# Patient Record
Sex: FEMALE | Race: AMERICAN INDIAN OR ALASKA NATIVE
[De-identification: names, ages, dates, MRNs, and addresses within clinical notes are randomized per-mention and may not be internally consistent; named-entity substitution may affect disease eponyms.]

---

## 2018-06-09 ENCOUNTER — HOSPITAL ENCOUNTER (INPATIENT)
Dept: HOSPITAL 31 - C.ER | Age: 83
LOS: 5 days | Discharge: NURSING, REHAB OR LONG-TERM CARE | DRG: 240 | End: 2018-06-14
Attending: SPECIALIST | Admitting: SPECIALIST
Payer: MEDICARE

## 2018-06-09 VITALS — BODY MASS INDEX: 28.1 KG/M2

## 2018-06-09 DIAGNOSIS — F02.80: ICD-10-CM

## 2018-06-09 DIAGNOSIS — Z95.0: ICD-10-CM

## 2018-06-09 DIAGNOSIS — D64.9: ICD-10-CM

## 2018-06-09 DIAGNOSIS — L97.529: ICD-10-CM

## 2018-06-09 DIAGNOSIS — Z79.4: ICD-10-CM

## 2018-06-09 DIAGNOSIS — E11.52: Primary | ICD-10-CM

## 2018-06-09 DIAGNOSIS — E78.5: ICD-10-CM

## 2018-06-09 DIAGNOSIS — I25.10: ICD-10-CM

## 2018-06-09 DIAGNOSIS — G30.9: ICD-10-CM

## 2018-06-09 DIAGNOSIS — I96: ICD-10-CM

## 2018-06-09 DIAGNOSIS — E11.621: ICD-10-CM

## 2018-06-09 DIAGNOSIS — I10: ICD-10-CM

## 2018-06-09 LAB
ALBUMIN SERPL-MCNC: 3.3 G/DL (ref 3.5–5)
ALBUMIN/GLOB SERPL: 1 {RATIO} (ref 1–2.1)
ALT SERPL-CCNC: 18 U/L (ref 9–52)
APTT BLD: 33 SECONDS (ref 21–34)
AST SERPL-CCNC: 42 U/L (ref 14–36)
BACTERIA #/AREA URNS HPF: (no result) /[HPF]
BASOPHILS # BLD AUTO: 0.1 K/UL (ref 0–0.2)
BASOPHILS NFR BLD: 1 % (ref 0–2)
BILIRUB UR-MCNC: NEGATIVE MG/DL
BUN SERPL-MCNC: 28 MG/DL (ref 7–17)
CALCIUM SERPL-MCNC: 9.2 MG/DL (ref 8.6–10.4)
EOSINOPHIL # BLD AUTO: 0 K/UL (ref 0–0.7)
EOSINOPHIL NFR BLD: 0.3 % (ref 0–4)
ERYTHROCYTE [DISTWIDTH] IN BLOOD BY AUTOMATED COUNT: 14.3 % (ref 11.5–14.5)
GFR NON-AFRICAN AMERICAN: 39
GLUCOSE UR STRIP-MCNC: NORMAL MG/DL
HGB BLD-MCNC: 8.9 G/DL (ref 11–16)
INR PPP: 1.2
LEUKOCYTE ESTERASE UR-ACNC: (no result) LEU/UL
LYMPHOCYTES # BLD AUTO: 1.6 K/UL (ref 1–4.3)
LYMPHOCYTES NFR BLD AUTO: 20.2 % (ref 20–40)
MCH RBC QN AUTO: 29 PG (ref 27–31)
MCHC RBC AUTO-ENTMCNC: 32.8 G/DL (ref 33–37)
MCV RBC AUTO: 88.4 FL (ref 81–99)
MONOCYTES # BLD: 0.6 K/UL (ref 0–0.8)
MONOCYTES NFR BLD: 7.5 % (ref 0–10)
NEUTROPHILS # BLD: 5.6 K/UL (ref 1.8–7)
NEUTROPHILS NFR BLD AUTO: 71 % (ref 50–75)
NRBC BLD AUTO-RTO: 0 % (ref 0–2)
PH UR STRIP: 6 [PH] (ref 5–8)
PLATELET # BLD: 481 K/UL (ref 130–400)
PMV BLD AUTO: 7.9 FL (ref 7.2–11.7)
PROT UR STRIP-MCNC: NEGATIVE MG/DL
PROTHROMBIN TIME: 13 SECONDS (ref 9.7–12.2)
RBC # BLD AUTO: 3.06 MIL/UL (ref 3.8–5.2)
RBC # UR STRIP: NEGATIVE /UL
SP GR UR STRIP: 1.01 (ref 1–1.03)
SQUAMOUS EPITHIAL: 1 /HPF (ref 0–5)
UROBILINOGEN UR-MCNC: NORMAL MG/DL (ref 0.2–1)
WBC # BLD AUTO: 7.8 K/UL (ref 4.8–10.8)

## 2018-06-09 RX ADMIN — CLINDAMYCIN PHOSPHATE SCH MLS/HR: 150 INJECTION, SOLUTION INTRAVENOUS at 22:28

## 2018-06-09 RX ADMIN — METOPROLOL SUCCINATE SCH MG: 50 TABLET, EXTENDED RELEASE ORAL at 22:32

## 2018-06-09 RX ADMIN — HUMAN INSULIN SCH: 100 INJECTION, SOLUTION SUBCUTANEOUS at 21:35

## 2018-06-09 NOTE — RAD
PROCEDURE:  CHEST RADIOGRAPH, 1 VIEW



HISTORY:

Pre Op



COMPARISON:

No prior similar study for comparison.



FINDINGS:



LUNGS:

There is a thin wall round lesion in the right lower lung may 

represent large bulla.  If clinically warranted further assessment by 

CT may be obtained.  Otherwise the lungs are clear.



PLEURA:

No pneumothorax or pleural fluid seen.



CARDIOVASCULAR:

The heart is normal in size.  Left-sided pacemaker is seen place



OSSEOUS STRUCTURES:

No significant abnormalities.



VISUALIZED UPPER ABDOMEN:

Normal.



OTHER FINDINGS:

None. 



IMPRESSION:

Thin wall round lesion at the right lower lung.  If indicated further 

assessment by CT may be obtained.

## 2018-06-09 NOTE — CP.PCM.CON
History of Present Illness





- History of Present Illness


History of Present Illness: 





Surgery Consult: Dr. Villarreal





Pt is an 83F with PMHx significant for DM, HTN, HLD, arrhythmia & dementia who 

presents to  with Left foot non healing wound. Pt is scheduled for AKA on 

Monday, however due to her extensive medical hx her primary wanted her 

optimized for OR. Currently, pt is resting comfortably in her bed and denies 

any complaints. She denies pain in her foot, denies N/V, F/C, chest pain or 

SOB. 





PMHx: DM, HTN, HLD, arrhythmia & dementia


PSHx: Left foot TMA, pacemaker 


SocialHx: denies smoking/EtOH


ALL: Penicillins 








Review of Systems





- Review of Systems


All systems: reviewed and no additional remarkable complaints except (as per HPI

)





Past Patient History





- Past Social History


Smoking Status: Never Smoked


Drugs: Denies





- CARDIAC


Hx Hypertension: Yes


Hx Pacemaker: Yes





- NEUROLOGICAL


Hx Dementia: Yes





- ENDOCRINE/METABOLIC


Hx Endocrine Disorders: Yes


Hx Diabetes Mellitus Type 2: Yes





- MUSCULOSKELETAL/RHEUMATOLOGICAL


Hx Fractures: Yes (left talus)





- PSYCHIATRIC


Hx Substance Use: No





- SURGICAL HISTORY


Hx Surgeries: Yes


Other/Comment: Pacemaker, left TMA





- ANESTHESIA


Hx Anesthesia: Yes


Hx Anesthesia Reactions: No





Meds


Allergies/Adverse Reactions: 


 Allergies











Allergy/AdvReac Type Severity Reaction Status Date / Time


 


Penicillins Allergy   Verified 06/09/18 12:03














- Medications


Medications: 


 Current Medications





Acetaminophen (Tylenol 325mg Tab)  650 mg PO Q6 PRN


   PRN Reason: Fever >100.4 F


Amlodipine Besylate (Norvasc)  10 mg PO DAILY [facility]


Ascorbic Acid (Vitamin C 500 Mg Tab)  500 mg PO DAILY [facility]


Donepezil HCl (Aricept)  5 mg PO HS [facility]


Ferrous Gluconate (Fergon)  324 mg PO DAILY [facility]


Furosemide (Lasix)  20 mg PO DAILY [facility]


Heparin Sodium (Porcine) (Heparin)  5,000 units SC Q12 [facility]


Home Med (Fenofibrate,Micronized [Fenofibrate])  134 mg PO DAILY [facility]


Hydralazine HCl (Apresoline)  100 mg PO Q8 [facility]


Sodium Chloride (Sodium Chloride 0.9%)  1,000 mls @ 100 mls/hr IV .Q10H ONE


   Stop: 06/09/18 22:35


   Last Admin: 06/09/18 12:54 Dose:  100 mls/hr


Sodium Chloride (Sodium Chloride 0.45%)  1,000 mls @ 60 mls/hr IV .[id number] [facility]


Clindamycin Phosphate 300 mg/ (Sodium Chloride)  52 mls @ 104 mls/hr IVPB Q8 [facility]


   PRN Reason: Protocol


Insulin Human Regular (Novolin R)  0 unit SC ACHS [facility]


   PRN Reason: Protocol


Losartan Potassium (Cozaar)  100 mg PO DAILY [facility]


Memantine (Namenda)  10 mg PO DAILY [facility]


Metoprolol Succinate (Toprol Xl)  50 mg PO Q12 [facility]


Multivitamins (Hexavitamin)  1 tab PO DAILY [facility]


Rosuvastatin Calcium (Crestor)  5 mg PO HS [facility]











Physical Exam





- Constitutional


Appears: Well, No Acute Distress





- Head Exam


Head Exam: ATRAUMATIC, NORMOCEPHALIC





- Eye Exam


Eye Exam: Normal appearance





- ENT Exam


ENT Exam: Mucous Membranes Moist





- Respiratory Exam


Respiratory Exam: NORMAL BREATHING PATTERN





- Cardiovascular Exam


Cardiovascular Exam: RRR





- GI/Abdominal Exam


GI & Abdominal Exam: Soft.  absent: Tenderness





- Extremities Exam


Additional comments: 





left foot with dressing, clean/dry/intact





- Neurological Exam


Neurological exam: Alert





- Skin


Skin Exam: Dry, Warm





Results





- Vital Signs


Recent Vital Signs: 


 Last Vital Signs











Temp  97.5 F L  06/09/18 16:56


 


Pulse  64   06/09/18 16:56


 


Resp  18   06/09/18 16:56


 


BP  144/61   06/09/18 16:56


 


Pulse Ox  100   06/09/18 16:56














- Labs


Result Diagrams: 


 06/09/18 12:51





 06/09/18 12:51


Labs: 


 Laboratory Results - last 24 hr











  06/09/18 06/09/18 06/09/18





  12:22 12:51 12:51


 


WBC   7.8 


 


RBC   3.06 L 


 


Hgb   8.9 L 


 


Hct   27.1 L 


 


MCV   88.4 


 


MCH   29.0 


 


MCHC   32.8 L 


 


RDW   14.3 


 


Plt Count   481 H 


 


MPV   7.9 


 


Neut % (Auto)   71.0 


 


Lymph % (Auto)   20.2 


 


Mono % (Auto)   7.5 


 


Eos % (Auto)   0.3 


 


Baso % (Auto)   1.0 


 


Neut # (Auto)   5.6 


 


Lymph # (Auto)   1.6 


 


Mono # (Auto)   0.6 


 


Eos # (Auto)   0.0 


 


Baso # (Auto)   0.1 


 


PT    13.0 H


 


INR    1.2


 


APTT    33


 


Sodium   


 


Potassium   


 


Chloride   


 


Carbon Dioxide   


 


Anion Gap   


 


BUN   


 


Creatinine   


 


Est GFR ( Amer)   


 


Est GFR (Non-Af Amer)   


 


POC Glucose (mg/dL)  250 H  


 


Random Glucose   


 


Calcium   


 


Total Bilirubin   


 


AST   


 


ALT   


 


Alkaline Phosphatase   


 


Total Protein   


 


Albumin   


 


Globulin   


 


Albumin/Globulin Ratio   


 


Urine Color   


 


Urine Clarity   


 


Urine pH   


 


Ur Specific Gravity   


 


Urine Protein   


 


Urine Glucose (UA)   


 


Urine Ketones   


 


Urine Blood   


 


Urine Nitrate   


 


Urine Bilirubin   


 


Urine Urobilinogen   


 


Ur Leukocyte Esterase   


 


Urine WBC (Auto)   


 


Urine RBC (Auto)   


 


Ur Squamous Epith Cells   


 


Urine Bacteria   


 


Blood Type   


 


Antibody Screen   














  06/09/18 06/09/18 06/09/18





  12:51 13:47 14:07


 


WBC   


 


RBC   


 


Hgb   


 


Hct   


 


MCV   


 


MCH   


 


MCHC   


 


RDW   


 


Plt Count   


 


MPV   


 


Neut % (Auto)   


 


Lymph % (Auto)   


 


Mono % (Auto)   


 


Eos % (Auto)   


 


Baso % (Auto)   


 


Neut # (Auto)   


 


Lymph # (Auto)   


 


Mono # (Auto)   


 


Eos # (Auto)   


 


Baso # (Auto)   


 


PT   


 


INR   


 


APTT   


 


Sodium  140  


 


Potassium  4.7  


 


Chloride  104  


 


Carbon Dioxide  25  


 


Anion Gap  15  


 


BUN  28 H  


 


Creatinine  1.3 H  


 


Est GFR ( Amer)  47  


 


Est GFR (Non-Af Amer)  39  


 


POC Glucose (mg/dL)   


 


Random Glucose  219 H  


 


Calcium  9.2  


 


Total Bilirubin  0.4  


 


AST  42 H  


 


ALT  18  


 


Alkaline Phosphatase  86  


 


Total Protein  6.8  


 


Albumin  3.3 L  


 


Globulin  3.4  


 


Albumin/Globulin Ratio  1.0  


 


Urine Color   Yellow 


 


Urine Clarity   Hazy 


 


Urine pH   6.0 


 


Ur Specific [place]   1.011 


 


Urine Protein   Negative 


 


Urine Glucose (UA)   Normal 


 


Urine Ketones   Negative 


 


Urine Blood   Negative 


 


Urine Nitrate   Negative 


 


Urine Bilirubin   Negative 


 


Urine Urobilinogen   Normal 


 


Ur Leukocyte Esterase   3+ H 


 


Urine WBC (Auto)   17 H 


 


Urine RBC (Auto)   1 


 


Ur Squamous Epith Cells   1 


 


Urine Bacteria   Rare 


 


Blood Type    A POSITIVE


 


Antibody Screen    Negative














Assessment & Plan





- Assessment and Plan (Free Text)


Assessment: 





83F with PVD & non healing left foot wound 


Plan: 





- OR for Left AKA on Monday 


- Pt has been seen by cardio as outpt & cleared for surgery


- Optimize for OR, pre-op Sun


- d/w Dr. Cherelle Alvarez, PGY-3

## 2018-06-09 NOTE — C.PDOC
History Of Present Illness


83 year old female with a history of dm and dementia BIBA from nursing home to 

the emergency department for evaluation of a left foot infection prior to a 

possible scheduled AKA. Patient has no other medical complaints. 


Time Seen by Provider: 06/09/18 12:35


Chief Complaint (Nursing): Medical Clearance


History Per: Patient


History/Exam Limitations: no limitations


Onset/Duration Of Symptoms: Hrs


Current Symptoms Are (Timing): Still Present





Past Medical History


Reviewed: Historical Data, Nursing Documentation, Vital Signs


Vital Signs: 


 Last Vital Signs











Temp  98.7 F   06/09/18 12:03


 


Pulse  67   06/09/18 12:03


 


Resp  20   06/09/18 12:03


 


BP  132/53 L  06/09/18 12:03


 


Pulse Ox  99   06/09/18 15:56














- Medical History


PMH: Dementia, Fractures (left talus)


Surgical History: Pacemaker


Family History: States: Unknown Family Hx





- Social History


Hx Alcohol Use: No


Hx Substance Use: No





- Immunization History


Hx Tetanus Toxoid Vaccination: No


Hx Influenza Vaccination: No


Hx Pneumococcal Vaccination: No





Review Of Systems


Constitutional: Negative for: Fever, Chills


Cardiovascular: Negative for: Chest Pain


Respiratory: Negative for: Cough, Shortness of Breath


Musculoskeletal: Positive for: Leg Pain


Skin: Positive for: Other (left foot infection)





Physical Exam





- Physical Exam


Appears: Non-toxic, No Acute Distress


Skin: Warm, Dry


Head: Atraumatic, Normacephalic


Eye(s): bilateral: Normal Inspection


Nose: Normal


Neck: Normal


Chest: Symmetrical, Other (left chest wall pacemaker in place)


Cardiovascular: Rhythm Regular, No Murmur


Respiratory: Normal Breath Sounds, No Rales, No Rhonchi, No Wheezing


Gastrointestinal/Abdominal: Soft, No Tenderness


Extremity: Other (left foot transmetatarsal amputation, 6cm x 2cm ulceration 

present on the left heel)


Neurological/Psych: Normal Speech, Other (alert, awake, at baseline confused. )





ED Course And Treatment





- Laboratory Results


Result Diagrams: 


 06/09/18 12:51





 06/09/18 12:51


ECG: Interpreted By Me, Viewed By Me


ECG Rhythm: Sinus Rhythm (62bpm, normal), ST/T Changes (abnormality)


ECG Interpretation: Abnormal


Interpretation Of ECG: Normal sinus rhythm at 62 bpm, anteroseptal infarct, age 

undetermined. ST/T wave abnormality, conisder inferolateral ischemia.  Abnormal 

EKG.


O2 Sat by Pulse Oximetry: 99 (RA)


Pulse Ox Interpretation: Normal


Progress Note: Plan:  BBK Type and Screen.  EKG.  CMP.  CBC.  PTT.  Prothrombin 

Time.  CXR One View.  Glucose POC.  NaCl IV Fluids.  Urinalysis





Medical Decision Making


Medical Decision Making: 





discussed with Dr Brown; pt scheduled for left leg aka on monday;  will 

admit to his service and give clindamycin  





Disposition


Discussed With Dr.: Oni Brown


Doctor Will See Patient In The: Hospital





- Disposition


Disposition: HOSPITALIZED


Disposition Time: 13:35


Condition: GOOD





- Clinical Impression


Clinical Impression: 


 Diabetic infection of left foot








- PA / NP / Resident Statement


MD/DO has reviewed & agrees with the documentation as recorded.





- Scribe Statement


The provider has reviewed the documentation as recorded by the Scribe (Yoel Lennon)


All medical record entries made by the Scribe were at my direction and 

personally dictated by me. I have reviewed the chart and agree that the record 

accurately reflects my personal performance of the history, physical exam, 

medical decision making, and the department course for this patient. I have 

also personally directed, reviewed, and agree with the discharge instructions 

and disposition.

## 2018-06-10 LAB
BASOPHILS # BLD AUTO: 0.1 K/UL (ref 0–0.2)
BASOPHILS NFR BLD: 0.9 % (ref 0–2)
BUN SERPL-MCNC: 18 MG/DL (ref 7–17)
CALCIUM SERPL-MCNC: 9.2 MG/DL (ref 8.6–10.4)
EOSINOPHIL # BLD AUTO: 0.1 K/UL (ref 0–0.7)
EOSINOPHIL NFR BLD: 0.9 % (ref 0–4)
ERYTHROCYTE [DISTWIDTH] IN BLOOD BY AUTOMATED COUNT: 14.3 % (ref 11.5–14.5)
GFR NON-AFRICAN AMERICAN: 47
HGB BLD-MCNC: 8.4 G/DL (ref 11–16)
LYMPHOCYTES # BLD AUTO: 1.9 K/UL (ref 1–4.3)
LYMPHOCYTES NFR BLD AUTO: 30.5 % (ref 20–40)
MCH RBC QN AUTO: 29.3 PG (ref 27–31)
MCHC RBC AUTO-ENTMCNC: 33.3 G/DL (ref 33–37)
MCV RBC AUTO: 88.2 FL (ref 81–99)
MONOCYTES # BLD: 0.5 K/UL (ref 0–0.8)
MONOCYTES NFR BLD: 7.9 % (ref 0–10)
NEUTROPHILS # BLD: 3.7 K/UL (ref 1.8–7)
NEUTROPHILS NFR BLD AUTO: 59.8 % (ref 50–75)
NRBC BLD AUTO-RTO: 0.1 % (ref 0–2)
PLATELET # BLD: 417 K/UL (ref 130–400)
PMV BLD AUTO: 7.8 FL (ref 7.2–11.7)
RBC # BLD AUTO: 2.86 MIL/UL (ref 3.8–5.2)
WBC # BLD AUTO: 6.3 K/UL (ref 4.8–10.8)

## 2018-06-10 RX ADMIN — HUMAN INSULIN SCH: 100 INJECTION, SOLUTION SUBCUTANEOUS at 07:56

## 2018-06-10 RX ADMIN — HUMAN INSULIN SCH U: 100 INJECTION, SOLUTION SUBCUTANEOUS at 12:37

## 2018-06-10 RX ADMIN — HUMAN INSULIN SCH: 100 INJECTION, SOLUTION SUBCUTANEOUS at 22:17

## 2018-06-10 RX ADMIN — Medication SCH TAB: at 10:31

## 2018-06-10 RX ADMIN — METOPROLOL SUCCINATE SCH: 50 TABLET, EXTENDED RELEASE ORAL at 22:16

## 2018-06-10 RX ADMIN — CLINDAMYCIN PHOSPHATE SCH MLS/HR: 150 INJECTION, SOLUTION INTRAVENOUS at 22:03

## 2018-06-10 RX ADMIN — CLINDAMYCIN PHOSPHATE SCH MLS/HR: 150 INJECTION, SOLUTION INTRAVENOUS at 05:52

## 2018-06-10 RX ADMIN — CLINDAMYCIN PHOSPHATE SCH MLS/HR: 150 INJECTION, SOLUTION INTRAVENOUS at 13:32

## 2018-06-10 RX ADMIN — HUMAN INSULIN SCH: 100 INJECTION, SOLUTION SUBCUTANEOUS at 17:43

## 2018-06-10 RX ADMIN — METOPROLOL SUCCINATE SCH MG: 50 TABLET, EXTENDED RELEASE ORAL at 22:08

## 2018-06-10 RX ADMIN — METOPROLOL SUCCINATE SCH MG: 50 TABLET, EXTENDED RELEASE ORAL at 10:31

## 2018-06-10 NOTE — CP.PCM.PN
Subjective





- Date & Time of Evaluation


Date of Evaluation: 06/10/18


Time of Evaluation: 08:44





- Subjective


Subjective: 





Surgery: Dr. Villarreal





Pt seen and examined. No acute overnight events. States she feels ok and denies 

pain at this time. No fevers recorded overnight. 





Objective





- Vital Signs/Intake and Output


Vital Signs (last 24 hours): 


 











Temp Pulse Resp BP Pulse Ox


 


 99 F   65   20   146/58 L  98 


 


 06/09/18 23:35  06/10/18 05:50  06/09/18 23:35  06/10/18 05:50  06/09/18 23:35








Intake and Output: 


 











 06/10/18 06/10/18





 06:59 18:59


 


Intake Total 540 


 


Balance 540 














- Medications


Medications: 


 Current Medications





Acetaminophen (Tylenol 325mg Tab)  650 mg PO Q6 PRN


   PRN Reason: Fever >100.4 F


Amlodipine Besylate (Norvasc)  10 mg PO DAILY [facility]


Ascorbic Acid (Vitamin C 500 Mg Tab)  500 mg PO DAILY [facility]


Donepezil HCl (Aricept)  5 mg PO HS [facility]


   Last Admin: 06/09/18 22:28 Dose:  5 mg


Ferrous Gluconate (Fergon)  324 mg PO DAILY [facility]


Furosemide (Lasix)  20 mg PO DAILY [facility]


Heparin Sodium (Porcine) (Heparin)  5,000 units SC Q12 [facility]


   Last Admin: 06/09/18 22:28 Dose:  5,000 units


Home Med (Fenofibrate,Micronized [Fenofibrate])  134 mg PO DAILY [facility]


Hydralazine HCl (Apresoline)  100 mg PO Q8 [facility]


   Last Admin: 06/10/18 05:52 Dose:  100 mg


Sodium Chloride (Sodium Chloride 0.45%)  1,000 mls @ 60 mls/hr IV .[id number] [facility]


   Last Admin: 06/09/18 18:42 Dose:  60 mls/hr


Clindamycin Phosphate 300 mg/ (Sodium Chloride)  52 mls @ 104 mls/hr IVPB Q8 [facility]


   PRN Reason: Protocol


   Last Admin: 06/10/18 05:52 Dose:  104 mls/hr


Insulin Human Regular (Novolin R)  0 unit SC ACHS [facility]


   PRN Reason: Protocol


   Last Admin: 06/10/18 07:56 Dose:  Not Given


Losartan Potassium (Cozaar)  100 mg PO DAILY [facility]


Memantine (Namenda)  10 mg PO DAILY [facility]


Metoprolol Succinate (Toprol Xl)  50 mg PO Q12 [facility]


   Last Admin: 06/09/18 22:32 Dose:  50 mg


Multivitamins (Hexavitamin)  1 tab PO DAILY [facility]


Rosuvastatin Calcium (Crestor)  5 mg PO HS [facility]


   Last Admin: 06/09/18 22:28 Dose:  5 mg











- Labs


Labs: 


 





 06/09/18 12:51 





 06/09/18 12:51 





 











PT  13.0 SECONDS (9.7-12.2)  H  06/09/18  12:51    


 


INR  1.2   06/09/18  12:51    


 


APTT  33 SECONDS (21-34)   06/09/18  12:51    














- Constitutional


Appears: Well, No Acute Distress





- Head Exam


Head Exam: ATRAUMATIC, NORMOCEPHALIC





- Eye Exam


Eye Exam: Normal appearance





- ENT Exam


ENT Exam: Mucous Membranes Moist





- Respiratory Exam


Respiratory Exam: NORMAL BREATHING PATTERN





- Cardiovascular Exam


Cardiovascular Exam: RRR





- GI/Abdominal Exam


GI & Abdominal Exam: Soft.  absent: Tenderness





- Extremities Exam


Additional comments: 





L foot with necrotic wound, no active drainage 





- Neurological Exam


Neurological Exam: Alert, Awake





- Skin


Skin Exam: Dry, Warm





Assessment and Plan





- Assessment and Plan (Free Text)


Assessment: 





83F with PVD and non healing left foot wound 


Plan: 





- plan for AKA tomorrow


- keep pt NPO past midnight


- d/w Dr. Cherelle Alvarez, PGY-3

## 2018-06-10 NOTE — HP
HISTORY OF PRESENT ILLNESS:  The patient is an 83-year-old female with a

history of chronic ulcer of the left foot, CAD, dementia, and Alzheimer. 

The patient was a nursing home resident.  The patient was transported to

the hospital for preparation of a procedure, AKA, by Dr. Villarreal.  In

fact, the patient has an ulcer for many years that was not healed, no sign

of healing.  The patient went to a vascular center at [place] and showed

very poor vascular condition.  Also, the patient had seen an outpatient

vascular surgeon, Dr. Villarreal, who has recommended amputation.  As the

patient was in the nursing home, she has a cardiology clearance with Dr. Wesly Goldstein and followed by cardiac catheterization.  The patient was

cleared by the cardiologist for the AKA.  The patient was then referred to

the hospital.



ALLERGIES:  THE PATIENT IS ALLERGIC TO PENICILLIN.



PAST MEDICAL HISTORY:  History of Alzheimer, diabetes, hypertension,

peripheral vascular disease.



PAST SURGICAL HISTORY:  The patient has a history of pacemaker insertion

due to arrhythmia and also the patient has previous amputation of the

metatarsophalangeal joints of both feet.



SOCIAL HISTORY:  The patient is not living in the nursing home.



FAMILY HISTORY:  None.



REVIEW OF SYSTEMS:  The patient cannot give much information; however, the

patient is not any respiratory distress, and the patient is calm.



PHYSICAL EXAMINATION:

GENERAL:  The patient is alert and awake, responding to simple questions.

VITAL SIGNS:  Blood pressure 144/61, pulse 64, respirations 18, temperature

97.5.

HEENT:  Head is normocephalic.  Mouth:  Multiple teeth loss.

NECK:  Supple.

LUNGS:  Clear.

HEART:  Regular rate and rhythm.  S1 and S2 present and positive murmur.

CHEST:  Has a pacemaker at the side of the chest wall.

ABDOMEN:  Soft.  Positive bowel sounds.

GENITALIA:  There is a large prolapse of the uterus known for many years.

EXTREMITIES:  There is an ulcer of the left foot with chronic changes, the

pulse is very faint.



ASSESSMENT AND PLAN:  The patient is admitted with diagnosis of chronic

ulcer of the left foot and advanced peripheral vascular disease and has

also diabetes, hypertension, and Alzheimer's.  The patient also has

arrhythmia.  The patient has a consult with Dr. Villarreal and also a consult

with Dr. Chuck Dr. _____.  The case was discussed and reviewed with

Parvin Wyatt, the nurse practitioner.





__________________________________________

Oni Brown MD







DD:  06/09/2018 17:16:02

DT:  06/09/2018 19:35:26

[facility] # [id number]

## 2018-06-11 LAB
BASOPHILS # BLD AUTO: 0 K/UL (ref 0–0.2)
BASOPHILS NFR BLD: 0.5 % (ref 0–2)
BUN SERPL-MCNC: 17 MG/DL (ref 7–17)
CALCIUM SERPL-MCNC: 9.1 MG/DL (ref 8.6–10.4)
EOSINOPHIL # BLD AUTO: 0.1 K/UL (ref 0–0.7)
EOSINOPHIL NFR BLD: 1.2 % (ref 0–4)
ERYTHROCYTE [DISTWIDTH] IN BLOOD BY AUTOMATED COUNT: 14.8 % (ref 11.5–14.5)
GFR NON-AFRICAN AMERICAN: 47
HGB BLD-MCNC: 8.6 G/DL (ref 11–16)
LYMPHOCYTES # BLD AUTO: 2.2 K/UL (ref 1–4.3)
LYMPHOCYTES NFR BLD AUTO: 39.1 % (ref 20–40)
MCH RBC QN AUTO: 28.8 PG (ref 27–31)
MCHC RBC AUTO-ENTMCNC: 32.7 G/DL (ref 33–37)
MCV RBC AUTO: 88.3 FL (ref 81–99)
MONOCYTES # BLD: 0.4 K/UL (ref 0–0.8)
MONOCYTES NFR BLD: 7 % (ref 0–10)
NEUTROPHILS # BLD: 2.9 K/UL (ref 1.8–7)
NEUTROPHILS NFR BLD AUTO: 52.2 % (ref 50–75)
NRBC BLD AUTO-RTO: 0.2 % (ref 0–2)
PLATELET # BLD: 447 K/UL (ref 130–400)
PMV BLD AUTO: 7.9 FL (ref 7.2–11.7)
RBC # BLD AUTO: 2.99 MIL/UL (ref 3.8–5.2)
WBC # BLD AUTO: 5.6 K/UL (ref 4.8–10.8)

## 2018-06-11 PROCEDURE — 0Y6D0Z2 DETACHMENT AT LEFT UPPER LEG, MID, OPEN APPROACH: ICD-10-PCS | Performed by: SURGERY

## 2018-06-11 RX ADMIN — Medication SCH: at 11:47

## 2018-06-11 RX ADMIN — HUMAN INSULIN SCH: 100 INJECTION, SOLUTION SUBCUTANEOUS at 07:31

## 2018-06-11 RX ADMIN — HUMAN INSULIN SCH: 100 INJECTION, SOLUTION SUBCUTANEOUS at 21:45

## 2018-06-11 RX ADMIN — HYDROMORPHONE HYDROCHLORIDE PRN MG: 1 INJECTION, SOLUTION INTRAMUSCULAR; INTRAVENOUS; SUBCUTANEOUS at 10:24

## 2018-06-11 RX ADMIN — CLINDAMYCIN PHOSPHATE SCH MLS/HR: 150 INJECTION, SOLUTION INTRAVENOUS at 22:32

## 2018-06-11 RX ADMIN — CLINDAMYCIN PHOSPHATE SCH MLS/HR: 150 INJECTION, SOLUTION INTRAVENOUS at 13:30

## 2018-06-11 RX ADMIN — HYDROMORPHONE HYDROCHLORIDE PRN MG: 1 INJECTION, SOLUTION INTRAMUSCULAR; INTRAVENOUS; SUBCUTANEOUS at 18:14

## 2018-06-11 RX ADMIN — HUMAN INSULIN SCH: 100 INJECTION, SOLUTION SUBCUTANEOUS at 12:12

## 2018-06-11 RX ADMIN — HUMAN INSULIN SCH U: 100 INJECTION, SOLUTION SUBCUTANEOUS at 18:06

## 2018-06-11 RX ADMIN — METOPROLOL SUCCINATE SCH MG: 50 TABLET, EXTENDED RELEASE ORAL at 21:56

## 2018-06-11 RX ADMIN — HYDROMORPHONE HYDROCHLORIDE PRN MG: 1 INJECTION, SOLUTION INTRAMUSCULAR; INTRAVENOUS; SUBCUTANEOUS at 10:57

## 2018-06-11 RX ADMIN — METOPROLOL SUCCINATE SCH: 50 TABLET, EXTENDED RELEASE ORAL at 11:49

## 2018-06-11 RX ADMIN — CLINDAMYCIN PHOSPHATE SCH MLS/HR: 150 INJECTION, SOLUTION INTRAVENOUS at 05:37

## 2018-06-11 NOTE — PCM.SURG1
Surgeon's Initial Post Op Note





- Surgeon's Notes


Surgeon: Cherelle


Assistant: SHAVON HowardY2. May, YUKI


Pre-Operative Diagnosis: Non healing left lower extremity wound


Operative Findings: stent


Post-Operative Diagnosis: same


Operation Performed: Left above knee amputation


Specimen/Specimens Removed: left lower leg above the knee


Estimated Blood Loss: EBL {In ML}: 300


Date of Surgery/Procedure: 06/11/18


Time of Surgery/Procedure: 08:30

## 2018-06-12 LAB
BASOPHILS # BLD AUTO: 0 K/UL (ref 0–0.2)
BASOPHILS NFR BLD: 0.3 % (ref 0–2)
BUN SERPL-MCNC: 21 MG/DL (ref 7–17)
CALCIUM SERPL-MCNC: 8.5 MG/DL (ref 8.6–10.4)
EOSINOPHIL # BLD AUTO: 0 K/UL (ref 0–0.7)
EOSINOPHIL NFR BLD: 0.1 % (ref 0–4)
ERYTHROCYTE [DISTWIDTH] IN BLOOD BY AUTOMATED COUNT: 14.4 % (ref 11.5–14.5)
ERYTHROCYTE [DISTWIDTH] IN BLOOD BY AUTOMATED COUNT: 14.7 % (ref 11.5–14.5)
GFR NON-AFRICAN AMERICAN: 43
HGB BLD-MCNC: 6.1 G/DL (ref 11–16)
HGB BLD-MCNC: 6.1 G/DL (ref 11–16)
LYMPHOCYTES # BLD AUTO: 1.4 K/UL (ref 1–4.3)
LYMPHOCYTES NFR BLD AUTO: 19.9 % (ref 20–40)
MCH RBC QN AUTO: 29.2 PG (ref 27–31)
MCH RBC QN AUTO: 29.4 PG (ref 27–31)
MCHC RBC AUTO-ENTMCNC: 33 G/DL (ref 33–37)
MCHC RBC AUTO-ENTMCNC: 33.6 G/DL (ref 33–37)
MCV RBC AUTO: 87.5 FL (ref 81–99)
MCV RBC AUTO: 88.4 FL (ref 81–99)
MONOCYTES # BLD: 0.9 K/UL (ref 0–0.8)
MONOCYTES NFR BLD: 12.5 % (ref 0–10)
NEUTROPHILS # BLD: 4.7 K/UL (ref 1.8–7)
NEUTROPHILS NFR BLD AUTO: 67.2 % (ref 50–75)
NRBC BLD AUTO-RTO: 0 % (ref 0–2)
PLATELET # BLD: 305 K/UL (ref 130–400)
PLATELET # BLD: 314 K/UL (ref 130–400)
PMV BLD AUTO: 7.5 FL (ref 7.2–11.7)
PMV BLD AUTO: 8 FL (ref 7.2–11.7)
RBC # BLD AUTO: 2.09 MIL/UL (ref 3.8–5.2)
RBC # BLD AUTO: 2.09 MIL/UL (ref 3.8–5.2)
WBC # BLD AUTO: 6.8 K/UL (ref 4.8–10.8)
WBC # BLD AUTO: 6.9 K/UL (ref 4.8–10.8)

## 2018-06-12 RX ADMIN — HUMAN INSULIN SCH U: 100 INJECTION, SOLUTION SUBCUTANEOUS at 13:37

## 2018-06-12 RX ADMIN — HUMAN INSULIN SCH: 100 INJECTION, SOLUTION SUBCUTANEOUS at 22:17

## 2018-06-12 RX ADMIN — METOPROLOL SUCCINATE SCH MG: 50 TABLET, EXTENDED RELEASE ORAL at 21:54

## 2018-06-12 RX ADMIN — CLINDAMYCIN PHOSPHATE SCH MLS/HR: 150 INJECTION, SOLUTION INTRAVENOUS at 13:45

## 2018-06-12 RX ADMIN — HYDROMORPHONE HYDROCHLORIDE PRN MG: 1 INJECTION, SOLUTION INTRAMUSCULAR; INTRAVENOUS; SUBCUTANEOUS at 02:32

## 2018-06-12 RX ADMIN — CLINDAMYCIN PHOSPHATE SCH MLS/HR: 150 INJECTION, SOLUTION INTRAVENOUS at 21:52

## 2018-06-12 RX ADMIN — HUMAN INSULIN SCH: 100 INJECTION, SOLUTION SUBCUTANEOUS at 13:38

## 2018-06-12 RX ADMIN — Medication SCH TAB: at 10:40

## 2018-06-12 RX ADMIN — CLINDAMYCIN PHOSPHATE SCH MLS/HR: 150 INJECTION, SOLUTION INTRAVENOUS at 06:30

## 2018-06-12 RX ADMIN — HUMAN INSULIN SCH U: 100 INJECTION, SOLUTION SUBCUTANEOUS at 17:50

## 2018-06-12 RX ADMIN — METOPROLOL SUCCINATE SCH MG: 50 TABLET, EXTENDED RELEASE ORAL at 10:40

## 2018-06-12 NOTE — CP.PCM.PN
Subjective





- Date & Time of Evaluation


Date of Evaluation: 06/12/18


Time of Evaluation: 13:47





- Subjective


Subjective: 





SURGERY NOTE FOR DR. BOSWELL





83F seen and examined at bedside. Patient resting comfortably in bed. No acute 

events. 





Objective





- Vital Signs/Intake and Output


Vital Signs (last 24 hours): 


 











Temp Pulse Resp BP Pulse Ox


 


 98.3 F   66   18   107/55 L  95 


 


 06/12/18 07:30  06/12/18 07:30  06/12/18 07:30  06/12/18 10:38  06/12/18 07:30








Intake and Output: 


 











 06/12/18 06/12/18





 06:59 18:59


 


Intake Total 1280 


 


Balance 1280 














- Medications


Medications: 


 Current Medications





Acetaminophen (Tylenol 325mg Tab)  650 mg PO Q6 PRN


   PRN Reason: Fever >100.4 F


Amlodipine Besylate (Norvasc)  10 mg PO DAILY [facility]


   Last Admin: 06/11/18 11:49 Dose:  Not Given


Ascorbic Acid (Vitamin C 500 Mg Tab)  500 mg PO DAILY [facility]


   Last Admin: 06/12/18 10:38 Dose:  500 mg


Donepezil HCl (Aricept)  5 mg PO HS [facility]


   Last Admin: 06/11/18 21:47 Dose:  5 mg


Ferrous Gluconate (Fergon)  324 mg PO DAILY [facility]


   Last Admin: 06/12/18 10:38 Dose:  324 mg


Furosemide (Lasix)  20 mg PO DAILY [facility]


   Last Admin: 06/12/18 10:38 Dose:  20 mg


Heparin Sodium (Porcine) (Heparin)  5,000 units SC Q12 [facility]


   Last Admin: 06/12/18 10:37 Dose:  5,000 units


Hydralazine HCl (Apresoline)  100 mg PO Q8 [facility]


   Last Admin: 06/12/18 06:39 Dose:  100 mg


Hydromorphone HCl (Dilaudid)  0.5 mg IVP Q3 PRN


   PRN Reason: Pain, severe (8-10)


   Last Admin: 06/12/18 02:32 Dose:  0.5 mg


Sodium Chloride (Sodium Chloride 0.45%)  1,000 mls @ 60 mls/hr IV .[id number] [facility]


   Last Admin: 06/11/18 19:45 Dose:  Not Given


Clindamycin Phosphate 300 mg/ (Sodium Chloride)  52 mls @ 104 mls/hr IVPB Q8 [facility]


   PRN Reason: Protocol


   Last Admin: 06/12/18 06:30 Dose:  104 mls/hr


Lactated Ringer's (Lactated Ringer's)  1,000 mls @ 0 mls/hr IV .Q0M [facility]


   PRN Reason: Per Protocol


Insulin Human Regular (Novolin R)  0 unit SC ACHS [facility]


   PRN Reason: Protocol


   Last Admin: 06/12/18 13:38 Dose:  Not Given


Losartan Potassium (Cozaar)  100 mg PO DAILY [facility]


   Last Admin: 06/12/18 10:40 Dose:  100 mg


Memantine (Namenda)  10 mg PO DAILY [facility]


   Last Admin: 06/12/18 10:40 Dose:  10 mg


Metoprolol Succinate (Toprol Xl)  50 mg PO Q12 [facility]


   Last Admin: 06/12/18 10:40 Dose:  50 mg


Multivitamins (Hexavitamin)  1 tab PO DAILY [facility]


   Last Admin: 06/12/18 10:40 Dose:  1 tab


Pneumococcal Polyvalent Vaccine (Pneumovax 23 Vaccine)  0.5 ml IM .ONCE ONE


   Stop: 06/13/18 10:01


Rosuvastatin Calcium (Crestor)  5 mg PO HS [facility]


   Last Admin: 06/11/18 21:48 Dose:  5 mg











- Labs


Labs: 


 





 06/12/18 13:25 





 06/12/18 09:18 





 











PT  13.0 SECONDS (9.7-12.2)  H  06/09/18  12:51    


 


INR  1.2   06/09/18  12:51    


 


APTT  33 SECONDS (21-34)   06/09/18  12:51    














- Constitutional


Appears: Non-toxic, No Acute Distress





- Respiratory Exam


Respiratory Exam: Clear to Ausculation Bilateral, NORMAL BREATHING PATTERN





- Cardiovascular Exam


Cardiovascular Exam: REGULAR RHYTHM, +S1, +S2





- GI/Abdominal Exam


GI & Abdominal Exam: Soft.  absent: Distended, Firm, Guarding, Rigid, Tenderness





- Extremities Exam


Extremities Exam: absent: Pedal Edema, Tenderness


Additional comments: 





dressing on site of AKA clean dry intact





- Neurological Exam


Neurological Exam: Alert, Awake





Assessment and Plan





- Assessment and Plan (Free Text)


Assessment: 





83F s/p left above knee amputation POD1


Plan: 





Pain control


Monitor dressing


Blood transfusion


Further recs discus with Dr. Rufina Howard, PGY2

## 2018-06-12 NOTE — PN
DATE:  06/12/2018



SUBJECTIVE:  Today, the patient is day 1 post AKA.  The patient is alert

and awake, but somewhat confused and denied any pain _____ this morning and

no shortness of breath.  No apparent distress.



PHYSICAL EXAMINATION:

VITAL SIGNS:  The patient has blood pressure of 127/55, pulse 60,

respirations 20, temperature 97.7, earlier temperature was 100.4.

HEENT:  Head is normocephalic.  Mouth is moist, but almost toothless.

NECK:  Supple.

LUNGS:  Clear.

HEART:  Regular rate and rhythm.

ABDOMEN:  Soft.  Nontender.  Positive bowel sounds.

EXTREMITIES:  There is left AKA, but no foul smell, no bleeding noted.



LABORATORY DATA:  The patient's blood work showed that the WBC is 6.8,

hemoglobin 6.1, hematocrit 18.3, and platelet _____, that was repeated one

from earlier this morning.



PLAN:  The case was discussed with Parvin Wyatt, the nurse practitioner

and the patient is having blood transfusion of maybe 2 units of packed RBC

and the diet will be just soft diet and we would continue the current

medication.







__________________________________________

Oni Bronw MD





DD:  06/12/2018 20:00:42

DT:  06/12/2018 20:20:09

[facility] # [id number]

## 2018-06-13 LAB
BASOPHILS # BLD AUTO: 0 K/UL (ref 0–0.2)
BASOPHILS NFR BLD: 0.2 % (ref 0–2)
EOSINOPHIL # BLD AUTO: 0 K/UL (ref 0–0.7)
EOSINOPHIL NFR BLD: 0.7 % (ref 0–4)
ERYTHROCYTE [DISTWIDTH] IN BLOOD BY AUTOMATED COUNT: 15.4 % (ref 11.5–14.5)
HGB BLD-MCNC: 7.8 G/DL (ref 11–16)
LYMPHOCYTES # BLD AUTO: 1.7 K/UL (ref 1–4.3)
LYMPHOCYTES NFR BLD AUTO: 22.9 % (ref 20–40)
MCH RBC QN AUTO: 28.9 PG (ref 27–31)
MCHC RBC AUTO-ENTMCNC: 33.5 G/DL (ref 33–37)
MCV RBC AUTO: 86.1 FL (ref 81–99)
MONOCYTES # BLD: 0.8 K/UL (ref 0–0.8)
MONOCYTES NFR BLD: 10.8 % (ref 0–10)
NEUTROPHILS # BLD: 4.9 K/UL (ref 1.8–7)
NEUTROPHILS NFR BLD AUTO: 65.4 % (ref 50–75)
NRBC BLD AUTO-RTO: 0 % (ref 0–2)
PLATELET # BLD: 280 K/UL (ref 130–400)
PMV BLD AUTO: 7.9 FL (ref 7.2–11.7)
RBC # BLD AUTO: 2.72 MIL/UL (ref 3.8–5.2)
WBC # BLD AUTO: 7.5 K/UL (ref 4.8–10.8)

## 2018-06-13 PROCEDURE — 30233N1 TRANSFUSION OF NONAUTOLOGOUS RED BLOOD CELLS INTO PERIPHERAL VEIN, PERCUTANEOUS APPROACH: ICD-10-PCS | Performed by: SPECIALIST

## 2018-06-13 RX ADMIN — CLINDAMYCIN PHOSPHATE SCH MLS/HR: 150 INJECTION, SOLUTION INTRAVENOUS at 06:27

## 2018-06-13 RX ADMIN — METOPROLOL SUCCINATE SCH MG: 50 TABLET, EXTENDED RELEASE ORAL at 22:21

## 2018-06-13 RX ADMIN — HUMAN INSULIN SCH U: 100 INJECTION, SOLUTION SUBCUTANEOUS at 16:20

## 2018-06-13 RX ADMIN — HUMAN INSULIN SCH: 100 INJECTION, SOLUTION SUBCUTANEOUS at 08:17

## 2018-06-13 RX ADMIN — CLINDAMYCIN PHOSPHATE SCH MLS/HR: 150 INJECTION, SOLUTION INTRAVENOUS at 22:20

## 2018-06-13 RX ADMIN — Medication SCH TAB: at 10:30

## 2018-06-13 RX ADMIN — HUMAN INSULIN SCH: 100 INJECTION, SOLUTION SUBCUTANEOUS at 12:20

## 2018-06-13 RX ADMIN — CLINDAMYCIN PHOSPHATE SCH MLS/HR: 150 INJECTION, SOLUTION INTRAVENOUS at 14:10

## 2018-06-13 RX ADMIN — HUMAN INSULIN SCH: 100 INJECTION, SOLUTION SUBCUTANEOUS at 22:19

## 2018-06-13 RX ADMIN — METOPROLOL SUCCINATE SCH MG: 50 TABLET, EXTENDED RELEASE ORAL at 10:29

## 2018-06-13 NOTE — OP
PROCEDURE DATE:  06/11/2018



PREOPERATIVE DIAGNOSIS:  Gangrene of the left foot.



POSTOPERATIVE DIAGNOSIS:  Gangrene of the left foot.



PROCEDURE:  Left above knee amputation.



SURGEON:  Flavio Villarreal Jr., MD



ASSISTANTS:  Marco Barksdale.



ANESTHESIOLOGIST:  Diaz Gates MD.



ANESTHESIA:  General anesthesia.



INDICATIONS:  An 83-year-old woman, dementia, unable to walk,

nonambulatory.  Gangrene of the left foot.  Patent stents in the leg.



OPERATIVE FINDINGS:  Standard left above knee amputation was carried out.



PROCEDURE:  The patient was given general anesthesia.  Standard left above

knee amputation was carried out.  After completion of the anastomosis and

obtaining hemostasis, we suture ligated any bleeding vessels.  We then

approximated the wounds with Monocryl, closed the skin with skin clips. 

Blood loss during the procedure was 300 mL.  Operation carried out, left

above knee amputation.





__________________________________________

Flavio Villarreal Jr., MD



cc:  Oni Brown MD



DD:  06/11/2018 9:31:59

DT:  06/11/2018 11:28:37

Job # [id number]

## 2018-06-13 NOTE — CARD
--------------- APPROVED REPORT --------------





EKG Measurement

Heart [id number]

ND 122P30

[id number]

[id number]

[id number]



<Conclusion>

Normal sinus rhythm

Anteroseptal infarct, age undetermined

ST & T wave abnormality, consider inferolateral ischemia

Abnormal ECG

## 2018-06-13 NOTE — CP.PCM.PN
Subjective





- Date & Time of Evaluation


Date of Evaluation: 06/13/18


Time of Evaluation: 17:57





- Subjective


Subjective: 


Vascular Surgery Progress Note for Dr. Villarreal





This 83F with was seen and examined this AM at bedside. No acute events 

overnight, pain well controlled. No new complaints at this time. 








Objective





- Vital Signs/Intake and Output


Vital Signs (last 24 hours): 


 











Temp Pulse Resp BP Pulse Ox


 


 99 F   60   20   130/57 L  100 


 


 06/13/18 17:25  06/13/18 17:25  06/13/18 17:25  06/13/18 17:25  06/13/18 15:10








Intake and Output: 


 











 06/13/18 06/13/18





 06:59 18:59


 


Intake Total 1585 600


 


Balance 1585 600














- Medications


Medications: 


 Current Medications





Acetaminophen (Tylenol 325mg Tab)  650 mg PO Q6 PRN


   PRN Reason: Fever >100.4 F


Amlodipine Besylate (Norvasc)  10 mg PO DAILY [facility]


   Last Admin: 06/13/18 10:29 Dose:  10 mg


Ascorbic Acid (Vitamin C 500 Mg Tab)  500 mg PO DAILY [facility]


   Last Admin: 06/13/18 10:29 Dose:  500 mg


Donepezil HCl (Aricept)  5 mg PO HS [facility]


   Last Admin: 06/12/18 21:52 Dose:  5 mg


Ferrous Gluconate (Fergon)  324 mg PO DAILY [facility]


   Last Admin: 06/13/18 10:30 Dose:  324 mg


Furosemide (Lasix)  20 mg PO DAILY [facility]


   Last Admin: 06/13/18 10:28 Dose:  20 mg


Heparin Sodium (Porcine) (Heparin)  5,000 units SC Q12 [facility]


   Last Admin: 06/13/18 10:30 Dose:  5,000 units


Hydralazine HCl (Apresoline)  100 mg PO Q8 [facility]


   Last Admin: 06/13/18 14:10 Dose:  100 mg


Hydromorphone HCl (Dilaudid)  0.5 mg IVP Q3 PRN


   PRN Reason: Pain, severe (8-10)


   Last Admin: 06/12/18 02:32 Dose:  0.5 mg


Clindamycin Phosphate 300 mg/ (Sodium Chloride)  52 mls @ 104 mls/hr IVPB Q8 [facility]


   PRN Reason: Protocol


   Last Admin: 06/13/18 14:10 Dose:  104 mls/hr


Insulin Human Regular (Novolin R)  0 unit SC ACHS [facility]


   PRN Reason: Protocol


   Last Admin: 06/13/18 16:20 Dose:  3 u


Losartan Potassium (Cozaar)  100 mg PO DAILY [facility]


   Last Admin: 06/13/18 10:28 Dose:  100 mg


Memantine (Namenda)  10 mg PO DAILY [facility]


   Last Admin: 06/13/18 10:30 Dose:  10 mg


Metoprolol Succinate (Toprol Xl)  50 mg PO Q12 [facility]


   Last Admin: 06/13/18 10:29 Dose:  50 mg


Multivitamins (Hexavitamin)  1 tab PO DAILY [facility]


   Last Admin: 06/13/18 10:30 Dose:  1 tab


Rosuvastatin Calcium (Crestor)  5 mg PO HS [facility]


   Last Admin: 06/12/18 21:53 Dose:  5 mg











- Labs


Labs: 


 





 06/13/18 07:00 





 06/12/18 09:18 





 











PT  13.0 SECONDS (9.7-12.2)  H  06/09/18  12:51    


 


INR  1.2   06/09/18  12:51    


 


APTT  33 SECONDS (21-34)   06/09/18  12:51    














- Constitutional


Appears: Non-toxic, Younger Than Stated Age





- Head Exam


Head Exam: NORMAL INSPECTION, NORMOCEPHALIC





- Eye Exam


Eye Exam: EOMI, Normal appearance





- ENT Exam


ENT Exam: Mucous Membranes Moist





- Respiratory Exam


Respiratory Exam: NORMAL BREATHING PATTERN





- Cardiovascular Exam


Cardiovascular Exam: +S1, +S2





- GI/Abdominal Exam


GI & Abdominal Exam: Soft.  absent: Firm, Guarding, Rigid, Tenderness





- Extremities Exam


Additional comments: 





dressing clean dry and intact 





- Neurological Exam


Neurological Exam: Alert, Awake





- Psychiatric Exam


Psychiatric exam: Normal Affect, Normal Mood





- Skin


Skin Exam: Dry, Intact





Assessment and Plan





- Assessment and Plan (Free Text)


Assessment: 


83F s/p left above knee amputation POD2








Pain control


Monitor dressing


Further recs discus with Dr. Rufina Jennings PGY2

## 2018-06-13 NOTE — PN
DATE:  06/11/2018



SUBJECTIVE:  Today, patient _____ alert and awake.  Denied any pain.  No

shortness of breath or dizziness.



PHYSICAL EXAMINATION:

VITAL SIGNS:  Blood pressure is 138/89, respirations 20, temperature 98.

_____.

NECK:  Supple.

LUNGS:  Clear.

HEART:  Regular rate and rhythm.

ABDOMEN:  Soft, benign.

EXTREMITIES:  _____



LABORATORY DATA:  _____ but did have a stump and the blood test that was

done _____ .  Chemistry showed a sodium of 144, potassium 3.8, chloride

105, bicarb is 22, BUN 13, creatinine 1.1, and glucose 101.



The plan is that _____ treatment and _____.





________________________________

Oni Brown MD





DD:  06/11/2018 22:43:34

DT:  06/12/2018 0:42:19

Job # [id number]

## 2018-06-14 VITALS
SYSTOLIC BLOOD PRESSURE: 153 MMHG | TEMPERATURE: 98.2 F | RESPIRATION RATE: 20 BRPM | HEART RATE: 62 BPM | OXYGEN SATURATION: 96 % | DIASTOLIC BLOOD PRESSURE: 55 MMHG

## 2018-06-14 LAB
BASOPHILS # BLD AUTO: 0 K/UL (ref 0–0.2)
BASOPHILS NFR BLD: 0.4 % (ref 0–2)
EOSINOPHIL # BLD AUTO: 0.1 K/UL (ref 0–0.7)
EOSINOPHIL NFR BLD: 1.1 % (ref 0–4)
ERYTHROCYTE [DISTWIDTH] IN BLOOD BY AUTOMATED COUNT: 15 % (ref 11.5–14.5)
HGB BLD-MCNC: 10.1 G/DL (ref 11–16)
LYMPHOCYTES # BLD AUTO: 1.7 K/UL (ref 1–4.3)
LYMPHOCYTES NFR BLD AUTO: 22.9 % (ref 20–40)
MCH RBC QN AUTO: 29.2 PG (ref 27–31)
MCHC RBC AUTO-ENTMCNC: 34.2 G/DL (ref 33–37)
MCV RBC AUTO: 85.3 FL (ref 81–99)
MONOCYTES # BLD: 0.6 K/UL (ref 0–0.8)
MONOCYTES NFR BLD: 8.5 % (ref 0–10)
NEUTROPHILS # BLD: 5 K/UL (ref 1.8–7)
NEUTROPHILS NFR BLD AUTO: 67.1 % (ref 50–75)
NRBC BLD AUTO-RTO: 0 % (ref 0–2)
PLATELET # BLD: 297 K/UL (ref 130–400)
PMV BLD AUTO: 8 FL (ref 7.2–11.7)
RBC # BLD AUTO: 3.45 MIL/UL (ref 3.8–5.2)
WBC # BLD AUTO: 7.4 K/UL (ref 4.8–10.8)

## 2018-06-14 RX ADMIN — METOPROLOL SUCCINATE SCH MG: 50 TABLET, EXTENDED RELEASE ORAL at 10:51

## 2018-06-14 RX ADMIN — Medication SCH TAB: at 10:51

## 2018-06-14 RX ADMIN — CLINDAMYCIN PHOSPHATE SCH MLS/HR: 150 INJECTION, SOLUTION INTRAVENOUS at 05:52

## 2018-06-14 RX ADMIN — HUMAN INSULIN SCH: 100 INJECTION, SOLUTION SUBCUTANEOUS at 08:06

## 2018-06-14 RX ADMIN — HUMAN INSULIN SCH U: 100 INJECTION, SOLUTION SUBCUTANEOUS at 13:31

## 2018-06-14 NOTE — CP.PCM.PN
Subjective





- Date & Time of Evaluation


Date of Evaluation: 06/14/18


Time of Evaluation: 08:41





- Subjective


Subjective: 





SURGERY NOTE FOR DR. BOSWELL





83F seen and examined overnight. Patient denies any pain and has no complaints. 





Objective





- Vital Signs/Intake and Output


Vital Signs (last 24 hours): 


 











Temp Pulse Resp BP Pulse Ox


 


 98.2 F   64   20   157/59 H  98 


 


 06/13/18 23:45  06/14/18 06:25  06/13/18 23:45  06/14/18 06:25  06/13/18 23:45








Intake and Output: 


 











 06/14/18 06/14/18





 06:59 18:59


 


Intake Total 1690 


 


Balance 1690 














- Medications


Medications: 


 Current Medications





Acetaminophen (Tylenol 325mg Tab)  650 mg PO Q6 PRN


   PRN Reason: Fever >100.4 F


Amlodipine Besylate (Norvasc)  10 mg PO DAILY [facility]


   Last Admin: 06/13/18 10:29 Dose:  10 mg


Ascorbic Acid (Vitamin C 500 Mg Tab)  500 mg PO DAILY [facility]


   Last Admin: 06/13/18 10:29 Dose:  500 mg


Donepezil HCl (Aricept)  5 mg PO HS [facility]


   Last Admin: 06/13/18 22:20 Dose:  5 mg


Ferrous Gluconate (Fergon)  324 mg PO DAILY [facility]


   Last Admin: 06/13/18 10:30 Dose:  324 mg


Furosemide (Lasix)  20 mg PO DAILY [facility]


   Last Admin: 06/13/18 10:28 Dose:  20 mg


Heparin Sodium (Porcine) (Heparin)  5,000 units SC Q12 [facility]


   Last Admin: 06/13/18 22:20 Dose:  5,000 units


Hydralazine HCl (Apresoline)  100 mg PO Q8 [facility]


   Last Admin: 06/14/18 06:41 Dose:  100 mg


Hydromorphone HCl (Dilaudid)  0.5 mg IVP Q3 PRN


   PRN Reason: Pain, severe (8-10)


   Last Admin: 06/12/18 02:32 Dose:  0.5 mg


Clindamycin Phosphate 300 mg/ (Sodium Chloride)  52 mls @ 104 mls/hr IVPB Q8 [facility]


   PRN Reason: Protocol


   Last Admin: 06/14/18 05:52 Dose:  104 mls/hr


Insulin Human Regular (Novolin R)  0 unit SC ACHS [facility]


   PRN Reason: Protocol


   Last Admin: 06/14/18 08:06 Dose:  Not Given


Losartan Potassium (Cozaar)  100 mg PO DAILY [facility]


   Last Admin: 06/13/18 10:28 Dose:  100 mg


Memantine (Namenda)  10 mg PO DAILY [facility]


   Last Admin: 06/13/18 10:30 Dose:  10 mg


Metoprolol Succinate (Toprol Xl)  50 mg PO Q12 [facility]


   Last Admin: 06/13/18 22:21 Dose:  50 mg


Multivitamins (Hexavitamin)  1 tab PO DAILY [facility]


   Last Admin: 06/13/18 10:30 Dose:  1 tab


Rosuvastatin Calcium (Crestor)  5 mg PO HS [facility]


   Last Admin: 06/13/18 22:20 Dose:  5 mg











- Labs


Labs: 


 





 06/14/18 06:28 





 06/12/18 09:18 





 











PT  13.0 SECONDS (9.7-12.2)  H  06/09/18  12:51    


 


INR  1.2   06/09/18  12:51    


 


APTT  33 SECONDS (21-34)   06/09/18  12:51    














- Constitutional


Appears: Non-toxic, No Acute Distress





- Respiratory Exam


Respiratory Exam: Clear to Ausculation Bilateral, NORMAL BREATHING PATTERN





- Cardiovascular Exam


Cardiovascular Exam: REGULAR RHYTHM, +S1, +S2





- GI/Abdominal Exam


GI & Abdominal Exam: Soft.  absent: Distended, Firm, Guarding, Rigid, Tenderness

, Rebound





- Extremities Exam


Additional comments: 





dressing of left AKA clean dry intact





Assessment and Plan





- Assessment and Plan (Free Text)


Assessment: 





83F s/p left AKA POD#3


Plan: 





- Monitor dressing on amputated site


- pain control


Further recs discuss with Dr. Cherelle Howard, PGY2

## 2018-06-14 NOTE — CP.PCM.PN
Subjective





- Date & Time of Evaluation


Date of Evaluation: 06/14/18


Time of Evaluation: 11:00





- Subjective


Subjective: 





Patient seen today , awake, alert, comfortable , denies any pain , sob 


 a febrile 


s/p left AKA POD#3


hgb - improved after PRBC transfusion 








Objective





- Vital Signs/Intake and Output


Vital Signs (last 24 hours): 


 











Temp Pulse Resp BP Pulse Ox


 


 99.1 F   123 H  18   146/60   98 


 


 06/14/18 09:01  06/14/18 09:01  06/14/18 09:01  06/14/18 10:51  06/14/18 09:01








Intake and Output: 


 











 06/14/18 06/14/18





 06:59 18:59


 


Intake Total 1690 


 


Balance 1690 














- Medications


Medications: 


 Current Medications





Acetaminophen (Tylenol 325mg Tab)  650 mg PO Q6 PRN


   PRN Reason: Fever >100.4 F


Amlodipine Besylate (Norvasc)  10 mg PO DAILY [facility]


   Last Admin: 06/14/18 10:51 Dose:  10 mg


Ascorbic Acid (Vitamin C 500 Mg Tab)  500 mg PO DAILY [facility]


   Last Admin: 06/14/18 10:51 Dose:  500 mg


Donepezil HCl (Aricept)  5 mg PO HS [facility]


   Last Admin: 06/13/18 22:20 Dose:  5 mg


Ferrous Gluconate (Fergon)  324 mg PO DAILY [facility]


   Last Admin: 06/14/18 10:52 Dose:  324 mg


Furosemide (Lasix)  20 mg PO DAILY [facility]


   Last Admin: 06/14/18 10:51 Dose:  20 mg


Heparin Sodium (Porcine) (Heparin)  5,000 units SC Q12 [facility]


   Last Admin: 06/14/18 10:51 Dose:  5,000 units


Hydralazine HCl (Apresoline)  100 mg PO Q8 [facility]


   Last Admin: 06/14/18 06:41 Dose:  100 mg


Hydromorphone HCl (Dilaudid)  0.5 mg IVP Q3 PRN


   PRN Reason: Pain, severe (8-10)


   Last Admin: 06/12/18 02:32 Dose:  0.5 mg


Clindamycin Phosphate 300 mg/ (Sodium Chloride)  52 mls @ 104 mls/hr IVPB Q8 [facility]


   PRN Reason: Protocol


   Last Admin: 06/14/18 05:52 Dose:  104 mls/hr


Insulin Human Regular (Novolin R)  0 unit SC ACHS [facility]


   PRN Reason: Protocol


   Last Admin: 06/14/18 08:06 Dose:  Not Given


Losartan Potassium (Cozaar)  100 mg PO DAILY [facility]


   Last Admin: 06/14/18 10:51 Dose:  100 mg


Memantine (Namenda)  10 mg PO DAILY [facility]


   Last Admin: 06/14/18 10:51 Dose:  10 mg


Metoprolol Succinate (Toprol Xl)  50 mg PO Q12 [facility]


   Last Admin: 06/14/18 10:51 Dose:  50 mg


Multivitamins (Hexavitamin)  1 tab PO DAILY [facility]


   Last Admin: 06/14/18 10:51 Dose:  1 tab


Rosuvastatin Calcium (Crestor)  5 mg PO HS [facility]


   Last Admin: 06/13/18 22:20 Dose:  5 mg











- Labs


Labs: 


 





 06/14/18 06:28 





 06/12/18 09:18 





 











PT  13.0 SECONDS (9.7-12.2)  H  06/09/18  12:51    


 


INR  1.2   06/09/18  12:51    


 


APTT  33 SECONDS (21-34)   06/09/18  12:51    














Assessment and Plan





- Assessment and Plan (Free Text)


Assessment: 





 83 yr old female with pmhx of Dementia, admitted with foot infection 


s/p  left AKA POD#3


post op- anemia - 


hgb improved with PRBC transfusion -10.1>6.1


D/W Dr. Almaraz , cleared fro beltran from surgical standpoint and f/u with Dr. Almaraz office in 4 weeks 


seen by Dr. Brown today, stable for discharge back to NH today and Dr. Brown will follow the patient at NH

## 2018-06-14 NOTE — PN
DATE:  06/13/2018



SUBJECTIVE:  Today, the patient is status post day 2 AKA.  The patient was

seen earlier this morning, offered no major complaints.  However, the

patient is confused and screaming at times.  There is no respiratory

distress.



PHYSICAL EXAMINATION:

VITAL SIGNS:  The patient has blood pressure of 135/52 _____, pulse 68,

respirations 20, temperature is 97.5.

NECK:  Supple.

HEENT: Mouth is moist.

HEART:  Regular rate and rhythm.

ABDOMEN:  Soft.  Positive bowel sounds.

EXTREMITIES:  There is a left AKA.



LABORATORY DATA:  Labs done that showed that WBC was 7.5, hemoglobin is 7.8

after 2 units of blood received,  hematocrit 23.4, and platelets 280.  The

patient also had a chemistry, the sugar was 161.



The plan is that we are going to _____ unit of packed RBC and _____.  Case

was discussed with DELMIS Thornton, the nurse practitioner from the

nursing team.





__________________________________________

Oni Brown MD





DD:  06/13/2018 23:49:37

DT:  06/14/2018 2:02:17

Job # [id number]

## 2018-06-15 NOTE — PN
DATE:  06/14/2018



SUBJECTIVE:  The patient was seen earlier in the morning.  The patient was

alert and awake but confused; however, she remembered me as her physician. 

Denies any shortness of breath.  No chest pain or palpitation.



PHYSICAL EXAMINATION:

VITAL SIGNS:  The patient has blood pressure of 146/60; pulse is 64, 62 and

at time 120; temperature 99.1, now at 14 hours, temperature was 98.2.

HEENT:  Head is normocephalic.  Mouth is moist, but almost toothless.

HEART:  Regular rate and rhythm.

LUNGS:  Clear.

ABDOMEN:  Soft.  Positive bowel sounds.

GENITOURINARY:  There is +4 prolapse of the uterus.

EXTREMITIES:  There is left AKA, no foul smell, no bleeding noted from the

AKA.  The right lower extremity has no tenderness.



So, the patient had blood test that showed WBC is 7.4, hemoglobin is 10.1

after blood transfusion, hematocrit 29.4, and platelets 297.  Chemistry,

the glucose is 203.  So, the plan is that the patient's contact was made by

the nurse practitioner, Parvin Wyatt, with Dr. Villarreal, the surgeon, and

the patient will be discharged to the nursing home today.





__________________________________________

Oni Brown MD





DD:  06/14/2018 20:29:19

DT:  06/14/2018 23:46:19

Job # [id number]

## 2018-06-15 NOTE — PQF GENQUE
***** This form is a permanent part of the medical record*****



To Flavio Villarreal MD,



The patient is an 83 year old  female with history of ulcer of the left foot, 
CAD, Dementia of Alzheimers type.

Patient is a nursing Home resident. Patient was transpoted to the Hospital for 
AKA. Procedure was  done.

Please Clarify  the ABOVE KNEE AMPUTATION   --   High ( proximal) ?

                                                                          MID?

                                                                          LOW?



Thank you,



















          

Clarification of your documentation is requested to better reflect the severity 
of illness and intensity of treatment of your patient.  



Indicators present      



[]  Specify: []

         



[]  Specify: []         

 



[]  Specify: []         





[]  Specify: []         





Location in the medical record that reflects the above clinical findings:  []

 



Treatment Provided:  []

  

PHYSICIAN'S RESPONSE





Based on your medical judgment of the clinical indicators outlined above please 
clarify the following:



[]  Practitioner response 



[]  If unable to determine, please check the box, sign and date.  





Present On Admission (POA) Indicator:





[] Present at the time of admission 



[] Not present at the time of admission



[] Clinically Undetermined









In responding to this query, please exercise your independent professional 
judgment.  The fact that a question is asked does not imply that any particular 
answer is desired or expected.  Thank you for your clarification on this 
documentation.



If you have any questions please call:[ ]

* Thank you,

   [ Kimberley Cueva, [facility]]

   HIM Coder
MARIA L

## 2018-06-15 NOTE — DS
HISTORY OF PRESENT ILLNESS:  The patient is an 83-year-old female with

history of left foot ulcer which _____ going into the heel.  The patient is

a nursing home resident, was brought to the hospital in order to have AKA

due to the gangrene of the foot.  Preadmission testing was done

accordingly, and the patient had surgery done by Dr. Villarreal.  The

patient's AKA was done.  The patient had supported the surgery without any

major incident, however, the patient was found to have severe anemia with a

hemoglobin of 6.5.  The patient received 2 to 3 packs of RBC and hemoglobin

now was 10.5.  The patient was evaluated due to left AKA.  There was no

bleeding, no foul smell noted and also patient was in her usual state of

health, i.e., patient was alert and awake but confused and forgetful.  The

patient has history of Alzheimer, diabetes,  hypertension, and peripheral

vascular disease.



Now, the patient is doing well and Dr. Villarreal was contacted by the nurse

practitioner, DELMIS Thornton, and argument was made to transfer this

patient to the nursing home where she came from.  The patient was

discharged today to the nursing home.





__________________________________________

Oni Brown MD





DD:  06/14/2018 20:33:09

DT:  06/15/2018 2:10:15

Job # [id number]